# Patient Record
Sex: FEMALE | Race: WHITE | NOT HISPANIC OR LATINO | Employment: OTHER | URBAN - METROPOLITAN AREA
[De-identification: names, ages, dates, MRNs, and addresses within clinical notes are randomized per-mention and may not be internally consistent; named-entity substitution may affect disease eponyms.]

---

## 2017-05-26 ENCOUNTER — IMPORTED ENCOUNTER (OUTPATIENT)
Dept: URBAN - METROPOLITAN AREA CLINIC 43 | Facility: CLINIC | Age: 72
End: 2017-05-26

## 2017-05-26 PROBLEM — D23.11: Noted: 2017-05-26

## 2017-05-26 PROBLEM — H35.361: Noted: 2017-05-26

## 2017-05-26 PROBLEM — H43.813: Noted: 2017-05-26

## 2017-05-26 PROBLEM — L25.9: Noted: 2017-05-26

## 2017-05-26 PROBLEM — H35.373: Noted: 2017-05-26

## 2018-05-18 ENCOUNTER — IMPORTED ENCOUNTER (OUTPATIENT)
Dept: URBAN - METROPOLITAN AREA CLINIC 43 | Facility: CLINIC | Age: 73
End: 2018-05-18

## 2018-05-18 PROBLEM — H35.373: Noted: 2018-05-18

## 2018-05-18 PROBLEM — H43.813: Noted: 2018-05-18

## 2019-06-06 ENCOUNTER — IMPORTED ENCOUNTER (OUTPATIENT)
Dept: URBAN - METROPOLITAN AREA CLINIC 43 | Facility: CLINIC | Age: 74
End: 2019-06-06

## 2019-06-06 PROBLEM — H35.373: Noted: 2019-06-06

## 2019-06-06 PROBLEM — H43.813: Noted: 2019-06-06

## 2019-06-06 PROBLEM — H16.223: Noted: 2019-06-06

## 2019-09-11 NOTE — PATIENT DISCUSSION
Continue: Artificial Tears (janeen/min) (white petrolatum-mineral oil): ointment: 83-15% Ramon Baxter, :2/10/1972    CONSENT FOR PROCEDURE/SEDATION    1. I authorize the performance upon Ramon Baxter  the following: Endometrial biopsy procedure    2.  I authorize Dr. Jose J Mendoza MD (and whomever is designated as the doc Relationship to patient: ____________________________________________    Witness: _________________________________________ Date:___________     Physician Signature: _______________________________ Date:___________

## 2020-04-19 ASSESSMENT — KERATOMETRY
OD_K1POWER_DIOPTERS: 41.25
OD_AXISANGLE_DEGREES: 97
OS_AXISANGLE2_DEGREES: 16
OS_AXISANGLE_DEGREES: 106
OS_K1POWER_DIOPTERS: 41.25
OD_K2POWER_DIOPTERS: 40.25
OD_AXISANGLE2_DEGREES: 7
OS_K2POWER_DIOPTERS: 40.75

## 2020-04-19 ASSESSMENT — VISUAL ACUITY
OS_CC: J1+
OD_CC: 20/20-2
OS_OTHER: 20/30.
OS_CC: J1+
OD_CC: 20/20
OD_CC: J1+
OS_OTHER: 20/80.
OD_CC: J1+
OS_CC: 20/20
OD_CC: J1
OS_CC: 20/20
OS_CC: J1+
OD_CC: 20/20 -3
OD_CC: 20/25
OS_CC: 20/20 -3
OS_CC: 20/20-1

## 2020-04-19 ASSESSMENT — TONOMETRY
OD_IOP_MMHG: 19.0
OD_IOP_MMHG: 21.0
OS_IOP_MMHG: 19.0
OS_IOP_MMHG: 17.0

## 2020-06-11 ENCOUNTER — ESTABLISHED COMPREHENSIVE EXAM (OUTPATIENT)
Dept: URBAN - METROPOLITAN AREA CLINIC 32 | Facility: CLINIC | Age: 75
End: 2020-06-11

## 2020-06-11 DIAGNOSIS — H35.373: ICD-10-CM

## 2020-06-11 DIAGNOSIS — H40.051: ICD-10-CM

## 2020-06-11 DIAGNOSIS — H04.123: ICD-10-CM

## 2020-06-11 PROCEDURE — 92134 CPTRZ OPH DX IMG PST SGM RTA: CPT

## 2020-06-11 PROCEDURE — 92014 COMPRE OPH EXAM EST PT 1/>: CPT

## 2020-06-11 RX ORDER — ERYTHROMYCIN 5 MG/G: OINTMENT OPHTHALMIC EVERY EVENING

## 2020-06-11 ASSESSMENT — VISUAL ACUITY
OD_CC: J1+
OS_CC: 20/20
OD_CC: 20/30-2

## 2020-06-11 ASSESSMENT — KERATOMETRY
OD_K2POWER_DIOPTERS: 41.5
OS_K1POWER_DIOPTERS: 40.75
OS_K2POWER_DIOPTERS: 41.25
OS_AXISANGLE_DEGREES: 104
OD_AXISANGLE2_DEGREES: 12
OS_AXISANGLE2_DEGREES: 14
OD_K1POWER_DIOPTERS: 40.5
OD_AXISANGLE_DEGREES: 102

## 2020-06-11 ASSESSMENT — TONOMETRY
OD_IOP_MMHG: 26
OD_IOP_MMHG: 24
OS_IOP_MMHG: 18

## 2020-06-22 ENCOUNTER — GLAUCOMA EVAL (OUTPATIENT)
Dept: URBAN - METROPOLITAN AREA CLINIC 32 | Facility: CLINIC | Age: 75
End: 2020-06-22

## 2020-06-22 DIAGNOSIS — H35.373: ICD-10-CM

## 2020-06-22 DIAGNOSIS — H04.123: ICD-10-CM

## 2020-06-22 DIAGNOSIS — H40.051: ICD-10-CM

## 2020-06-22 PROCEDURE — 92012 INTRM OPH EXAM EST PATIENT: CPT

## 2020-06-22 PROCEDURE — 76514 ECHO EXAM OF EYE THICKNESS: CPT

## 2020-06-22 PROCEDURE — 92133 CPTRZD OPH DX IMG PST SGM ON: CPT

## 2020-06-22 PROCEDURE — 92083 EXTENDED VISUAL FIELD XM: CPT

## 2020-06-22 ASSESSMENT — KERATOMETRY
OD_AXISANGLE_DEGREES: 102
OD_AXISANGLE2_DEGREES: 12
OS_K1POWER_DIOPTERS: 40.75
OD_K1POWER_DIOPTERS: 40.5
OS_AXISANGLE2_DEGREES: 14
OD_K2POWER_DIOPTERS: 41.5
OS_K2POWER_DIOPTERS: 41.25
OS_AXISANGLE_DEGREES: 104

## 2020-06-22 ASSESSMENT — TONOMETRY
OD_IOP_MMHG: 18
OS_IOP_MMHG: 16

## 2020-06-22 ASSESSMENT — PACHYMETRY
OS_CT_UM: 535
OD_CT_UM: 550

## 2020-06-22 ASSESSMENT — VISUAL ACUITY
OD_CC: 20/25+2
OS_CC: 20/25

## 2020-07-06 ENCOUNTER — SURGERY/PROCEDURE (OUTPATIENT)
Dept: URBAN - METROPOLITAN AREA CLINIC 32 | Facility: CLINIC | Age: 75
End: 2020-07-06

## 2020-07-06 DIAGNOSIS — H40.051: ICD-10-CM

## 2020-07-06 PROCEDURE — 65855 TRABECULOPLASTY LASER SURG: CPT

## 2020-07-09 ASSESSMENT — KERATOMETRY
OD_AXISANGLE_DEGREES: 102
OD_AXISANGLE2_DEGREES: 12
OS_K1POWER_DIOPTERS: 40.75
OD_K1POWER_DIOPTERS: 40.5
OS_K2POWER_DIOPTERS: 41.25
OD_K2POWER_DIOPTERS: 41.5
OS_AXISANGLE_DEGREES: 104
OS_AXISANGLE2_DEGREES: 14

## 2020-07-20 ENCOUNTER — FOLLOW UP (OUTPATIENT)
Dept: URBAN - METROPOLITAN AREA CLINIC 32 | Facility: CLINIC | Age: 75
End: 2020-07-20

## 2020-07-20 DIAGNOSIS — H40.051: ICD-10-CM

## 2020-07-20 DIAGNOSIS — Z98.890: ICD-10-CM

## 2020-07-20 PROCEDURE — 92012 INTRM OPH EXAM EST PATIENT: CPT

## 2020-07-20 ASSESSMENT — KERATOMETRY
OS_AXISANGLE2_DEGREES: 14
OS_AXISANGLE_DEGREES: 104
OD_K1POWER_DIOPTERS: 40.5
OD_AXISANGLE_DEGREES: 102
OS_K2POWER_DIOPTERS: 41.25
OS_K1POWER_DIOPTERS: 40.75
OD_AXISANGLE2_DEGREES: 12
OD_K2POWER_DIOPTERS: 41.5

## 2020-07-20 ASSESSMENT — VISUAL ACUITY
OD_CC: 20/20-1
OS_CC: 20/25

## 2020-07-20 ASSESSMENT — TONOMETRY
OD_IOP_MMHG: 15
OS_IOP_MMHG: 16

## 2020-10-22 ENCOUNTER — IOP CHECK (OUTPATIENT)
Dept: URBAN - METROPOLITAN AREA CLINIC 32 | Facility: CLINIC | Age: 75
End: 2020-10-22

## 2020-10-22 DIAGNOSIS — H40.051: ICD-10-CM

## 2020-10-22 DIAGNOSIS — Z98.890: ICD-10-CM

## 2020-10-22 PROCEDURE — 92012 INTRM OPH EXAM EST PATIENT: CPT

## 2020-10-22 ASSESSMENT — KERATOMETRY
OS_K1POWER_DIOPTERS: 40.75
OD_K1POWER_DIOPTERS: 40.5
OD_AXISANGLE_DEGREES: 102
OD_K2POWER_DIOPTERS: 41.5
OD_AXISANGLE2_DEGREES: 12
OS_K2POWER_DIOPTERS: 41.25
OS_AXISANGLE2_DEGREES: 14
OS_AXISANGLE_DEGREES: 104

## 2020-10-22 ASSESSMENT — TONOMETRY
OD_IOP_MMHG: 14
OS_IOP_MMHG: 14

## 2020-10-22 ASSESSMENT — VISUAL ACUITY
OD_CC: 20/25
OS_CC: 20/25

## 2021-01-01 ENCOUNTER — OFFICE VISIT (OUTPATIENT)
Dept: URBAN - METROPOLITAN AREA CLINIC 68 | Facility: CLINIC | Age: 76
End: 2021-01-01

## 2021-04-19 ENCOUNTER — OFFICE VISIT (OUTPATIENT)
Dept: URBAN - METROPOLITAN AREA CLINIC 68 | Facility: CLINIC | Age: 76
End: 2021-04-19

## 2021-05-10 ENCOUNTER — OFFICE VISIT (OUTPATIENT)
Dept: URBAN - METROPOLITAN AREA SURGERY CENTER 12 | Facility: SURGERY CENTER | Age: 76
End: 2021-05-10

## 2021-05-11 ENCOUNTER — LAB OUTSIDE AN ENCOUNTER (OUTPATIENT)
Dept: URBAN - METROPOLITAN AREA CLINIC 68 | Facility: CLINIC | Age: 76
End: 2021-05-11

## 2021-05-11 ENCOUNTER — TELEPHONE ENCOUNTER (OUTPATIENT)
Dept: URBAN - METROPOLITAN AREA CLINIC 68 | Facility: CLINIC | Age: 76
End: 2021-05-11

## 2021-05-11 LAB
01: (no result)
01: (no result)

## 2021-05-12 ENCOUNTER — TELEPHONE ENCOUNTER (OUTPATIENT)
Dept: URBAN - METROPOLITAN AREA CLINIC 68 | Facility: CLINIC | Age: 76
End: 2021-05-12

## 2021-06-16 ENCOUNTER — ESTABLISHED COMPREHENSIVE EXAM (OUTPATIENT)
Dept: URBAN - METROPOLITAN AREA CLINIC 32 | Facility: CLINIC | Age: 76
End: 2021-06-16

## 2021-06-16 DIAGNOSIS — H40.051: ICD-10-CM

## 2021-06-16 DIAGNOSIS — H04.123: ICD-10-CM

## 2021-06-16 PROCEDURE — 92250 FUNDUS PHOTOGRAPHY W/I&R: CPT

## 2021-06-16 PROCEDURE — 92015 DETERMINE REFRACTIVE STATE: CPT

## 2021-06-16 PROCEDURE — 92014 COMPRE OPH EXAM EST PT 1/>: CPT

## 2021-06-16 ASSESSMENT — KERATOMETRY
OD_K2POWER_DIOPTERS: 41.5
OD_K1POWER_DIOPTERS: 40.5
OD_AXISANGLE_DEGREES: 102
OD_AXISANGLE2_DEGREES: 12
OS_AXISANGLE_DEGREES: 104
OS_K2POWER_DIOPTERS: 41.25
OS_AXISANGLE2_DEGREES: 14
OS_K1POWER_DIOPTERS: 40.75

## 2021-06-16 ASSESSMENT — VISUAL ACUITY
OU_CC: J1+
OS_CC: 20/20-3
OD_GLARE: 20/40
OD_CC: 20/40+2
OD_PH: 20/30+2

## 2021-06-16 ASSESSMENT — TONOMETRY
OD_IOP_MMHG: 14
OS_IOP_MMHG: 15

## 2021-12-15 ENCOUNTER — ESTABLISHED PATIENT (OUTPATIENT)
Dept: URBAN - METROPOLITAN AREA CLINIC 32 | Facility: CLINIC | Age: 76
End: 2021-12-15

## 2021-12-15 DIAGNOSIS — H04.123: ICD-10-CM

## 2021-12-15 DIAGNOSIS — H40.053: ICD-10-CM

## 2021-12-15 PROCEDURE — 92133 CPTRZD OPH DX IMG PST SGM ON: CPT

## 2021-12-15 PROCEDURE — 92020 GONIOSCOPY: CPT

## 2021-12-15 PROCEDURE — 92012 INTRM OPH EXAM EST PATIENT: CPT

## 2021-12-15 PROCEDURE — 92083 EXTENDED VISUAL FIELD XM: CPT

## 2021-12-15 ASSESSMENT — KERATOMETRY
OS_K2POWER_DIOPTERS: 41.25
OD_K2POWER_DIOPTERS: 41.5
OS_K1POWER_DIOPTERS: 40.75
OS_AXISANGLE2_DEGREES: 14
OD_AXISANGLE2_DEGREES: 12
OD_AXISANGLE_DEGREES: 102
OD_K1POWER_DIOPTERS: 40.5
OS_AXISANGLE_DEGREES: 104

## 2021-12-15 ASSESSMENT — VISUAL ACUITY
OD_CC: 20/25-3
OS_CC: 20/20-3

## 2021-12-15 ASSESSMENT — TONOMETRY
OD_IOP_MMHG: 12
OS_IOP_MMHG: 13

## 2022-06-04 ENCOUNTER — TELEPHONE ENCOUNTER (OUTPATIENT)
Dept: URBAN - METROPOLITAN AREA CLINIC 68 | Facility: CLINIC | Age: 77
End: 2022-06-04

## 2022-06-04 RX ORDER — SODIUM SULFATE, POTASSIUM SULFATE, MAGNESIUM SULFATE 17.5; 3.13; 1.6 G/ML; G/ML; G/ML
SOLUTION, CONCENTRATE ORAL AS DIRECTED
Qty: 1 | Refills: 0 | OUTPATIENT
Start: 2016-05-31 | End: 2021-04-19

## 2022-06-04 RX ORDER — POLYETHYLENE GLYCOL 3350, SODIUM SULFATE, SODIUM CHLORIDE, POTASSIUM CHLORIDE, ASCORBIC ACID, SODIUM ASCORBATE 140-9-5.2G
KIT ORAL AS DIRECTED
Qty: 280 | Refills: 0 | OUTPATIENT
Start: 2021-04-19 | End: 2021-04-20

## 2022-06-04 RX ORDER — SODIUM SULFATE, MAGNESIUM SULFATE, AND POTASSIUM CHLORIDE 17.75; 2.7; 2.25 G/1; G/1; G/1
TABLET ORAL AS DIRECTED
Qty: 24 | Refills: 0 | OUTPATIENT
Start: 2021-04-19 | End: 2021-04-20

## 2022-06-04 RX ORDER — EZETIMIBE 10 MG/1
ZETIA( 10MG ORAL  DAILY ) INACTIVE -HX ENTRY TABLET ORAL DAILY
OUTPATIENT
Start: 2021-04-19

## 2022-06-04 RX ORDER — NIACIN 250 MG
TABLET, EXTENDED RELEASE ORAL QD
OUTPATIENT
Start: 2010-11-12 | End: 2016-05-31

## 2022-06-05 ENCOUNTER — TELEPHONE ENCOUNTER (OUTPATIENT)
Dept: URBAN - METROPOLITAN AREA CLINIC 68 | Facility: CLINIC | Age: 77
End: 2022-06-05

## 2022-06-05 RX ORDER — EZETIMIBE 10 MG/1
EZETIMIBE( 10MG ORAL   ) ACTIVE -HX ENTRY TABLET ORAL
Status: ACTIVE | COMMUNITY
Start: 2021-04-19

## 2022-06-05 RX ORDER — SIMVASTATIN 40 MG/1
SIMVASTATIN( 40MG ORAL  DAILY ) ACTIVE -HX ENTRY TABLET, FILM COATED ORAL DAILY
Status: ACTIVE | COMMUNITY
Start: 2021-04-19

## 2022-06-05 RX ORDER — LEVOTHYROXINE SODIUM 0.07 MG/1
LEVOTHYROXINE SODIUM( 75MCG ORAL  DAILY ) ACTIVE -HX ENTRY TABLET ORAL DAILY
Status: ACTIVE | COMMUNITY
Start: 2021-04-19

## 2022-06-05 RX ORDER — MULTIVIT-MIN/FA/LYCOPEN/LUTEIN .4-300-25
CENTRUM SILVER(  ORAL   ) ACTIVE -HX ENTRY TABLET ORAL
Status: ACTIVE | COMMUNITY
Start: 2021-04-19

## 2022-06-05 RX ORDER — LORATADINE 10 MG
MIRALAX( 17GM ORAL   ) ACTIVE -HX ENTRY TABLET,DISINTEGRATING ORAL
Status: ACTIVE | COMMUNITY
Start: 2021-04-19

## 2022-06-21 ASSESSMENT — KERATOMETRY
OS_AXISANGLE2_DEGREES: 14
OD_K1POWER_DIOPTERS: 40.5
OS_AXISANGLE_DEGREES: 104
OS_K1POWER_DIOPTERS: 40.75
OD_AXISANGLE2_DEGREES: 12
OD_K2POWER_DIOPTERS: 41.5
OS_K2POWER_DIOPTERS: 41.25
OD_AXISANGLE_DEGREES: 102

## 2022-06-22 ENCOUNTER — COMPREHENSIVE EXAM (OUTPATIENT)
Dept: URBAN - METROPOLITAN AREA CLINIC 32 | Facility: CLINIC | Age: 77
End: 2022-06-22

## 2022-06-22 DIAGNOSIS — H35.373: ICD-10-CM

## 2022-06-22 DIAGNOSIS — H40.053: ICD-10-CM

## 2022-06-22 DIAGNOSIS — H04.123: ICD-10-CM

## 2022-06-22 DIAGNOSIS — H43.813: ICD-10-CM

## 2022-06-22 PROCEDURE — A4263 PERMANENT TEAR DUCT PLUG: HCPCS

## 2022-06-22 PROCEDURE — 99214 OFFICE O/P EST MOD 30 MIN: CPT

## 2022-06-22 PROCEDURE — 68761Q PUNCTAL PLUG/QUINTESS DISSOLVABLE PLUG/EACH

## 2022-06-22 PROCEDURE — 92015 DETERMINE REFRACTIVE STATE: CPT

## 2022-06-22 PROCEDURE — 92250 FUNDUS PHOTOGRAPHY W/I&R: CPT

## 2022-06-22 ASSESSMENT — TONOMETRY
OD_IOP_MMHG: 15
OS_IOP_MMHG: 15

## 2022-06-22 ASSESSMENT — VISUAL ACUITY
OD_CC: 20/30
OS_CC: 20/25-2

## 2022-06-25 ENCOUNTER — TELEPHONE ENCOUNTER (OUTPATIENT)
Age: 77
End: 2022-06-25

## 2022-06-25 RX ORDER — SODIUM SULFATE, POTASSIUM SULFATE, MAGNESIUM SULFATE 17.5; 3.13; 1.6 G/ML; G/ML; G/ML
SOLUTION, CONCENTRATE ORAL AS DIRECTED
Qty: 1 | Refills: 0 | OUTPATIENT
Start: 2016-05-31 | End: 2021-04-19

## 2022-06-25 RX ORDER — SODIUM SULFATE, MAGNESIUM SULFATE, AND POTASSIUM CHLORIDE 17.75; 2.7; 2.25 G/1; G/1; G/1
TABLET ORAL AS DIRECTED
Qty: 24 | Refills: 0 | OUTPATIENT
Start: 2021-04-19 | End: 2021-04-20

## 2022-06-25 RX ORDER — TRIAMCINOLONE ACETONIDE 55 UG/1
NASACORT(     ) INACTIVE -HX ENTRY SPRAY, METERED NASAL
OUTPATIENT
Start: 2006-11-21

## 2022-06-25 RX ORDER — OMEGA-3/DHA/EPA/FISH OIL 1000 MG
CAPSULE ORAL
OUTPATIENT
Start: 2010-11-12 | End: 2021-04-19

## 2022-06-25 RX ORDER — ERGOCALCIFEROL 1.25 MG/1
VITAMIN D-400(    1 PO DAILY ) INACTIVE -HX ENTRY CAPSULE ORAL
OUTPATIENT
Start: 2010-11-12

## 2022-06-25 RX ORDER — HYDROCORTISONE ACETATE AND PRAMOXINE HYDROCHLORIDE 25; 10 MG/G; MG/G
ANALPRAM-HC KIT(    APPLY TO PERIANAL AREA THREE TIMES EACH DAY ) INACTIVE -HX ENTRY CREAM TOPICAL
OUTPATIENT
Start: 2010-11-12

## 2022-06-25 RX ORDER — EZETIMIBE AND SIMVASTATIN 10; 10 MG/1; MG/1
VYTORIN(    QD ) INACTIVE -HX ENTRY TABLET ORAL QD
OUTPATIENT
Start: 2010-11-12

## 2022-06-25 RX ORDER — EZETIMIBE 10 MG/1
ZETIA( 10MG ORAL  DAILY ) INACTIVE -HX ENTRY TABLET ORAL DAILY
OUTPATIENT
Start: 2021-04-19

## 2022-06-25 RX ORDER — ELECTROLYTES/DEXTROSE
SOLUTION, ORAL ORAL
OUTPATIENT
Start: 2010-11-12 | End: 2021-04-19

## 2022-06-25 RX ORDER — POLYETHYLENE GLYCOL 3350, SODIUM SULFATE, SODIUM CHLORIDE, POTASSIUM CHLORIDE, ASCORBIC ACID, SODIUM ASCORBATE 140-9-5.2G
KIT ORAL AS DIRECTED
Qty: 280 | Refills: 0 | OUTPATIENT
Start: 2021-04-19 | End: 2021-04-20

## 2022-06-25 RX ORDER — NIACIN 250 MG
TABLET, EXTENDED RELEASE ORAL QD
OUTPATIENT
Start: 2010-11-12 | End: 2016-05-31

## 2022-06-25 RX ORDER — SPIRONOLACT/HYDROCHLOROTHIAZID 25 MG-25MG
TABLET ORAL AS DIRECTED
OUTPATIENT
Start: 2010-11-12 | End: 2021-04-19

## 2022-06-25 RX ORDER — CALCIUM CARBONATE/VITAMIN D3 600 MG-10
TABLET ORAL
OUTPATIENT
Start: 2010-11-12 | End: 2021-04-19

## 2022-06-25 RX ORDER — EZETIMIBE AND SIMVASTATIN 10; 10 MG/1; MG/1
VYTORIN(    ONE TABLET ORALLY EACH DAY ) INACTIVE -HX ENTRY TABLET ORAL
OUTPATIENT
Start: 2006-11-21

## 2022-06-26 ENCOUNTER — TELEPHONE ENCOUNTER (OUTPATIENT)
Age: 77
End: 2022-06-26

## 2022-06-26 RX ORDER — POLYETHYLENE GLYCOL 3350 17 G
MIRALAX( 17GM ORAL   ) ACTIVE -HX ENTRY POWDER IN PACKET (EA) ORAL
Status: ACTIVE | COMMUNITY
Start: 2021-04-19

## 2022-06-26 RX ORDER — MULTIVIT-MIN/FA/LYCOPEN/LUTEIN .4-300-25
CENTRUM SILVER(  ORAL   ) ACTIVE -HX ENTRY TABLET ORAL
Status: ACTIVE | COMMUNITY
Start: 2021-04-19

## 2022-06-26 RX ORDER — EZETIMIBE 10 MG/1
EZETIMIBE( 10MG ORAL   ) ACTIVE -HX ENTRY TABLET ORAL
Status: ACTIVE | COMMUNITY
Start: 2021-04-19

## 2022-06-26 RX ORDER — NAPROXEN SODIUM 220 MG
ALEVE(    AS NEEDED ) ACTIVE -HX ENTRY TABLET ORAL AS NEEDED
Status: ACTIVE | COMMUNITY
Start: 2010-11-12

## 2022-06-26 RX ORDER — LEVOTHYROXINE SODIUM 0.07 MG/1
LEVOTHYROXINE SODIUM( 75MCG ORAL  DAILY ) ACTIVE -HX ENTRY TABLET ORAL DAILY
Status: ACTIVE | COMMUNITY
Start: 2021-04-19

## 2022-06-26 RX ORDER — SIMVASTATIN 40 MG/1
SIMVASTATIN( 40MG ORAL  DAILY ) ACTIVE -HX ENTRY TABLET, FILM COATED ORAL DAILY
Status: ACTIVE | COMMUNITY
Start: 2021-04-19

## 2022-09-19 NOTE — PATIENT DISCUSSION
[facility] monitoring, AREDS2 vitamins, no smoking, green leafy vegetables discussed.

## 2022-12-19 ENCOUNTER — FOLLOW UP (OUTPATIENT)
Dept: URBAN - METROPOLITAN AREA CLINIC 32 | Facility: CLINIC | Age: 77
End: 2022-12-19

## 2022-12-19 PROCEDURE — A4262 TEMPORARY TEAR DUCT PLUG: HCPCS

## 2022-12-19 PROCEDURE — 99213 OFFICE O/P EST LOW 20 MIN: CPT

## 2022-12-19 PROCEDURE — 68761Q PUNCTAL PLUG/QUINTESS DISSOLVABLE PLUG/EACH

## 2022-12-19 ASSESSMENT — KERATOMETRY
OD_K1POWER_DIOPTERS: 40.5
OS_AXISANGLE_DEGREES: 104
OD_K2POWER_DIOPTERS: 41.5
OS_K2POWER_DIOPTERS: 41.25
OS_AXISANGLE2_DEGREES: 14
OS_K1POWER_DIOPTERS: 40.75
OD_AXISANGLE_DEGREES: 102
OD_AXISANGLE2_DEGREES: 12

## 2022-12-19 ASSESSMENT — VISUAL ACUITY
OS_SC: 20/25
OD_SC: 20/25

## 2022-12-19 ASSESSMENT — TONOMETRY
OD_IOP_MMHG: 16
OS_IOP_MMHG: 16

## 2023-06-27 ENCOUNTER — COMPREHENSIVE EXAM (OUTPATIENT)
Dept: URBAN - METROPOLITAN AREA CLINIC 32 | Facility: CLINIC | Age: 78
End: 2023-06-27

## 2023-06-27 DIAGNOSIS — H40.053: ICD-10-CM

## 2023-06-27 DIAGNOSIS — H04.123: ICD-10-CM

## 2023-06-27 DIAGNOSIS — H43.813: ICD-10-CM

## 2023-06-27 DIAGNOSIS — H35.363: ICD-10-CM

## 2023-06-27 DIAGNOSIS — H10.45: ICD-10-CM

## 2023-06-27 DIAGNOSIS — H35.373: ICD-10-CM

## 2023-06-27 PROCEDURE — 92133 CPTRZD OPH DX IMG PST SGM ON: CPT

## 2023-06-27 PROCEDURE — A4262 TEMPORARY TEAR DUCT PLUG: HCPCS

## 2023-06-27 PROCEDURE — 99214 OFFICE O/P EST MOD 30 MIN: CPT

## 2023-06-27 PROCEDURE — 68761Q PUNCTAL PLUG/QUINTESS DISSOLVABLE PLUG/EACH

## 2023-06-27 ASSESSMENT — VISUAL ACUITY
OS_SC: J7
OD_CC: J1+
OD_CC: 20/25-1
OD_SC: 20/40-1
OD_SC: J10
OS_SC: 20/30
OS_CC: J1+
OS_CC: 20/25

## 2023-06-27 ASSESSMENT — KERATOMETRY
OS_AXISANGLE2_DEGREES: 105
OD_AXISANGLE_DEGREES: 8
OD_K1POWER_DIOPTERS: 41.50
OD_K2POWER_DIOPTERS: 40.50
OD_AXISANGLE2_DEGREES: 98
OS_K2POWER_DIOPTERS: 41.00
OS_AXISANGLE_DEGREES: 15
OS_K1POWER_DIOPTERS: 41.50

## 2023-06-27 ASSESSMENT — TONOMETRY
OD_IOP_MMHG: 20
OS_IOP_MMHG: 18

## 2024-01-08 ENCOUNTER — COMPREHENSIVE EXAM (OUTPATIENT)
Dept: URBAN - METROPOLITAN AREA CLINIC 32 | Facility: CLINIC | Age: 79
End: 2024-01-08

## 2024-01-08 DIAGNOSIS — H04.123: ICD-10-CM

## 2024-01-08 DIAGNOSIS — H40.053: ICD-10-CM

## 2024-01-08 DIAGNOSIS — H35.363: ICD-10-CM

## 2024-01-08 DIAGNOSIS — H35.373: ICD-10-CM

## 2024-01-08 DIAGNOSIS — H43.813: ICD-10-CM

## 2024-01-08 DIAGNOSIS — H10.45: ICD-10-CM

## 2024-01-08 PROCEDURE — A4262 TEMPORARY TEAR DUCT PLUG: HCPCS

## 2024-01-08 PROCEDURE — 99214 OFFICE O/P EST MOD 30 MIN: CPT

## 2024-01-08 PROCEDURE — 92250 FUNDUS PHOTOGRAPHY W/I&R: CPT

## 2024-01-08 PROCEDURE — 68761Q PUNCTAL PLUG/QUINTESS DISSOLVABLE PLUG/EACH

## 2024-01-08 ASSESSMENT — VISUAL ACUITY
OD_CC: J1+
OS_CC: 20/25
OD_CC: 20/30-1
OS_CC: J1+

## 2024-01-08 ASSESSMENT — KERATOMETRY
OS_AXISANGLE_DEGREES: 15
OD_AXISANGLE_DEGREES: 8
OD_AXISANGLE2_DEGREES: 98
OS_AXISANGLE2_DEGREES: 105
OD_K2POWER_DIOPTERS: 40.50
OS_K1POWER_DIOPTERS: 41.50
OD_K1POWER_DIOPTERS: 41.50
OS_K2POWER_DIOPTERS: 41.00

## 2024-01-08 ASSESSMENT — TONOMETRY
OS_IOP_MMHG: 19
OD_IOP_MMHG: 20

## 2024-07-08 ENCOUNTER — COMPREHENSIVE EXAM (OUTPATIENT)
Dept: URBAN - METROPOLITAN AREA CLINIC 32 | Facility: CLINIC | Age: 79
End: 2024-07-08

## 2024-07-08 DIAGNOSIS — H40.053: ICD-10-CM

## 2024-07-08 DIAGNOSIS — H04.123: ICD-10-CM

## 2024-07-08 DIAGNOSIS — H10.45: ICD-10-CM

## 2024-07-08 DIAGNOSIS — Z96.1: ICD-10-CM

## 2024-07-08 DIAGNOSIS — H35.373: ICD-10-CM

## 2024-07-08 DIAGNOSIS — H43.813: ICD-10-CM

## 2024-07-08 DIAGNOSIS — H35.363: ICD-10-CM

## 2024-07-08 PROCEDURE — 99214 OFFICE O/P EST MOD 30 MIN: CPT

## 2024-07-08 PROCEDURE — 68761Q PUNCTAL PLUG/QUINTESS DISSOLVABLE PLUG/EACH

## 2024-07-08 PROCEDURE — A4262 TEMPORARY TEAR DUCT PLUG: HCPCS

## 2024-07-08 PROCEDURE — 92015 DETERMINE REFRACTIVE STATE: CPT

## 2024-07-08 ASSESSMENT — KERATOMETRY
OS_K2POWER_DIOPTERS: 41.00
OD_K2POWER_DIOPTERS: 40.50
OD_AXISANGLE2_DEGREES: 98
OD_AXISANGLE_DEGREES: 8
OS_AXISANGLE_DEGREES: 15
OS_AXISANGLE2_DEGREES: 105
OD_K1POWER_DIOPTERS: 41.50
OS_K1POWER_DIOPTERS: 41.50

## 2024-07-08 ASSESSMENT — VISUAL ACUITY
OD_CC: J1+/-1
OS_CC: 20/20
OS_CC: J1+
OD_CC: 20/40+2

## 2024-07-08 ASSESSMENT — TONOMETRY
OD_IOP_MMHG: 18
OS_IOP_MMHG: 21

## 2025-01-20 ENCOUNTER — FOLLOW UP (OUTPATIENT)
Age: 80
End: 2025-01-20

## 2025-01-20 DIAGNOSIS — H04.123: ICD-10-CM

## 2025-01-20 PROCEDURE — 68761L LACRIFILL: Mod: E3,E1,E2,E4

## 2025-01-20 PROCEDURE — 99213 OFFICE O/P EST LOW 20 MIN: CPT

## 2025-08-05 ENCOUNTER — COMPREHENSIVE EXAM (OUTPATIENT)
Age: 80
End: 2025-08-05

## 2025-08-05 DIAGNOSIS — H02.831: ICD-10-CM

## 2025-08-05 DIAGNOSIS — H35.363: ICD-10-CM

## 2025-08-05 DIAGNOSIS — H43.813: ICD-10-CM

## 2025-08-05 DIAGNOSIS — H10.45: ICD-10-CM

## 2025-08-05 DIAGNOSIS — H04.123: ICD-10-CM

## 2025-08-05 DIAGNOSIS — H40.053: ICD-10-CM

## 2025-08-05 DIAGNOSIS — H02.834: ICD-10-CM

## 2025-08-05 DIAGNOSIS — H35.373: ICD-10-CM

## 2025-08-05 PROCEDURE — A4262 TEMPORARY TEAR DUCT PLUG: HCPCS

## 2025-08-05 PROCEDURE — 92015 DETERMINE REFRACTIVE STATE: CPT

## 2025-08-05 PROCEDURE — 68761Q PUNCTAL PLUG/QUINTESS DISSOLVABLE PLUG/EACH

## 2025-08-05 PROCEDURE — 99214 OFFICE O/P EST MOD 30 MIN: CPT

## 2025-08-18 ENCOUNTER — CONSULTATION/EVALUATION (OUTPATIENT)
Age: 80
End: 2025-08-18

## 2025-08-18 DIAGNOSIS — H02.834: ICD-10-CM

## 2025-08-18 DIAGNOSIS — H00.15: ICD-10-CM

## 2025-08-18 DIAGNOSIS — H02.831: ICD-10-CM

## 2025-08-18 DIAGNOSIS — H01.00A: ICD-10-CM

## 2025-08-18 DIAGNOSIS — H01.00B: ICD-10-CM

## 2025-08-18 DIAGNOSIS — H04.123: ICD-10-CM

## 2025-08-18 DIAGNOSIS — H57.813: ICD-10-CM

## 2025-08-18 PROCEDURE — 92081 LIMITED VISUAL FIELD XM: CPT

## 2025-08-18 PROCEDURE — 99214 OFFICE O/P EST MOD 30 MIN: CPT

## 2025-08-18 PROCEDURE — 92285 EXTERNAL OCULAR PHOTOGRAPHY: CPT
